# Patient Record
Sex: FEMALE | Race: WHITE
[De-identification: names, ages, dates, MRNs, and addresses within clinical notes are randomized per-mention and may not be internally consistent; named-entity substitution may affect disease eponyms.]

---

## 2017-05-24 ENCOUNTER — HOSPITAL ENCOUNTER (OUTPATIENT)
Dept: HOSPITAL 80 - BHFA | Age: 82
End: 2017-05-24
Attending: INTERNAL MEDICINE
Payer: COMMERCIAL

## 2017-05-24 DIAGNOSIS — I10: ICD-10-CM

## 2017-05-24 DIAGNOSIS — E78.5: ICD-10-CM

## 2017-05-24 DIAGNOSIS — G47.33: ICD-10-CM

## 2017-05-24 DIAGNOSIS — I48.91: Primary | ICD-10-CM

## 2017-05-24 DIAGNOSIS — E11.9: ICD-10-CM

## 2017-05-24 DIAGNOSIS — M19.90: ICD-10-CM

## 2017-05-25 ENCOUNTER — HOSPITAL ENCOUNTER (OUTPATIENT)
Dept: HOSPITAL 80 - FCATH | Age: 82
Discharge: HOME | End: 2017-05-25
Attending: INTERNAL MEDICINE
Payer: COMMERCIAL

## 2017-05-25 DIAGNOSIS — E78.5: ICD-10-CM

## 2017-05-25 DIAGNOSIS — I48.0: Primary | ICD-10-CM

## 2017-05-25 DIAGNOSIS — Z53.8: ICD-10-CM

## 2017-05-25 DIAGNOSIS — E11.9: ICD-10-CM

## 2017-05-25 DIAGNOSIS — G47.33: ICD-10-CM

## 2017-05-25 DIAGNOSIS — Z85.3: ICD-10-CM

## 2017-05-25 DIAGNOSIS — I10: ICD-10-CM

## 2017-05-25 DIAGNOSIS — Z79.01: ICD-10-CM

## 2017-05-25 LAB
ANION GAP SERPL CALC-SCNC: 13 MEQ/L (ref 8–16)
APTT BLD: 39 SEC (ref 23–38)
CALCIUM SERPL-MCNC: 9.9 MG/DL (ref 8.5–10.4)
CHLORIDE SERPL-SCNC: 104 MEQ/L (ref 97–110)
CO2 SERPL-SCNC: 24 MEQ/L (ref 22–31)
CREAT SERPL-MCNC: 1 MG/DL (ref 0.6–1)
GFR SERPL CREATININE-BSD FRML MDRD: 53 ML/MIN/{1.73_M2}
GLUCOSE SERPL-MCNC: 141 MG/DL (ref 70–100)
INR PPP: 1.31 (ref 0.83–1.16)
MAGNESIUM SERPL-MCNC: 2.1 MG/DL (ref 1.6–2.3)
POTASSIUM SERPL-SCNC: 4.4 MEQ/L (ref 3.5–5.2)
PROTHROMBIN TIME: 16.3 SEC (ref 12–15)
SODIUM SERPL-SCNC: 141 MEQ/L (ref 134–144)

## 2017-05-25 PROCEDURE — 4A02X4Z MEASUREMENT OF CARDIAC ELECTRICAL ACTIVITY, EXTERNAL APPROACH: ICD-10-PCS | Performed by: INTERNAL MEDICINE

## 2017-05-25 NOTE — CPEKG
Heart Rate: 125

RR Interval: 480

QRSD Interval: 78

QT Interval: 328

QTC Interval: 473

QRS Axis: 6

T Wave Axis: 68

EKG Severity - ABNORMAL ECG -

EKG Impression: ATRIAL FIBRILLATION

EKG Impression: COMPARED WITH 2/19/2016 AT 4:40 P.M., ATRIAL FIBRILLATION NOW PRESENT

Electronically Signed By: Rima Bocanegra 25-May-2017 11:49:54

## 2017-06-08 ENCOUNTER — HOSPITAL ENCOUNTER (OUTPATIENT)
Dept: HOSPITAL 80 - FIMAGING | Age: 82
End: 2017-06-08
Attending: INTERNAL MEDICINE
Payer: COMMERCIAL

## 2017-06-08 DIAGNOSIS — Z12.31: Primary | ICD-10-CM

## 2017-06-08 DIAGNOSIS — Z90.12: ICD-10-CM

## 2017-06-08 DIAGNOSIS — Z85.3: ICD-10-CM

## 2017-06-26 ENCOUNTER — HOSPITAL ENCOUNTER (INPATIENT)
Dept: HOSPITAL 80 - F2W | Age: 82
LOS: 2 days | Discharge: HOME | DRG: 310 | End: 2017-06-28
Attending: INTERNAL MEDICINE | Admitting: INTERNAL MEDICINE
Payer: COMMERCIAL

## 2017-06-26 DIAGNOSIS — Z79.84: ICD-10-CM

## 2017-06-26 DIAGNOSIS — I10: ICD-10-CM

## 2017-06-26 DIAGNOSIS — I48.91: Primary | ICD-10-CM

## 2017-06-26 DIAGNOSIS — E11.9: ICD-10-CM

## 2017-06-26 LAB
% IMMATURE GRANULYOCYTES: 0.3 % (ref 0–1.1)
ABSOLUTE IMMATURE GRANULOCYTES: 0.02 10^3/UL (ref 0–0.1)
ABSOLUTE NRBC COUNT: 0.02 10^3/UL (ref 0–0.01)
ADD DIFF?: NO
ADD MORPH?: NO
ADD SCAN?: NO
ANION GAP SERPL CALC-SCNC: 9 MEQ/L (ref 8–16)
ATYPICAL LYMPHOCYTE FLAG: 0 (ref 0–99)
CALCIUM SERPL-MCNC: 9.9 MG/DL (ref 8.5–10.4)
CHLORIDE SERPL-SCNC: 104 MEQ/L (ref 97–110)
CO2 SERPL-SCNC: 23 MEQ/L (ref 22–31)
CREAT SERPL-MCNC: 0.9 MG/DL (ref 0.6–1)
ERYTHROCYTE [DISTWIDTH] IN BLOOD BY AUTOMATED COUNT: 12.5 % (ref 11.5–15.2)
FRAGMENT RBC FLAG: 0 (ref 0–99)
GFR SERPL CREATININE-BSD FRML MDRD: 60 ML/MIN/{1.73_M2}
GLUCOSE SERPL-MCNC: 112 MG/DL (ref 70–100)
HCT VFR BLD CALC: 41.2 % (ref 38–47)
HGB BLD-MCNC: 13.9 G/DL (ref 12.6–16.3)
LEFT SHIFT FLG: 0 (ref 0–99)
LIPEMIA HEMOLYSIS FLAG: 80 (ref 0–99)
MAGNESIUM SERPL-MCNC: 2 MG/DL (ref 1.6–2.3)
MCH RBC BLDCO QN: 31.1 PG (ref 27.9–34.1)
MCHC RBC AUTO-ENTMCNC: 33.7 G/DL (ref 32.4–36.7)
MCV RBC AUTO: 92.2 FL (ref 81.5–99.8)
NRBC-AUTO%: 0.3 % (ref 0–0.2)
PLATELET # BLD: 243 10^3/UL (ref 150–400)
PLATELET CLUMPS FLAG: 0 (ref 0–99)
PMV BLD AUTO: 9.6 FL (ref 8.7–11.7)
POTASSIUM SERPL-SCNC: 4.1 MEQ/L (ref 3.5–5.2)
RBC # BLD AUTO: 4.47 10^6/UL (ref 4.18–5.33)
SODIUM SERPL-SCNC: 136 MEQ/L (ref 134–144)

## 2017-06-26 RX ADMIN — SOTALOL HYDROCHLORIDE SCH MG: 80 TABLET ORAL at 20:07

## 2017-06-26 RX ADMIN — RIVAROXABAN SCH MG: 20 TABLET, FILM COATED ORAL at 20:07

## 2017-06-26 RX ADMIN — ATORVASTATIN CALCIUM SCH MG: 20 TABLET, FILM COATED ORAL at 20:07

## 2017-06-26 NOTE — CPEKG
Heart Rate: 60

RR Interval: 1000

P-R Interval: 180

QRSD Interval: 84

QT Interval: 456

QTC Interval: 456

P Axis: 43

QRS Axis: 20

T Wave Axis: 39

EKG Severity - NORMAL ECG -

EKG Impression: SINUS RHYTHM

Electronically Signed By: Eric Fisher 27-Jun-2017 16:14:56

## 2017-06-26 NOTE — PDCARPN
Cardiology Progress Note


Chief Complaint: 





PAF


Assessment/Plan: 


Assessment:





85 y/o F with increasing episodes of AF. Last DCCV 5/25/17.





#. PAF: increasing episodes of AF


   Sotalol titration in this admission


   will follow ECG and elytes


   in SR currently


   [id number] of 5





#. htn: home meds continued





#. DM: on Metformin





#. status inpt for high-risk med titration





#. DVT ppx: on Xarelto as outpatient








Plan:


Start Sotalol with PM dose at 120. ECGs and labs timed appropriately.





06/26/17 13:59





Objective: 





 Vital Signs (8 Hrs)











  Temp Pulse Resp BP Pulse Ox


 


 06/26/17 10:49  98.7 F  64  18  125/72 H  91 L








 Intake/Output (24 Hrs)











 06/25/17 06/26/17 06/27/17





 05:59 05:59 05:59


 


Other:   


 


  Weight   79.7 kg











Result Diagrams: 


 06/26/17 12:15





 06/26/17 12:15





ICD10 Worksheet


Patient Problems: 


 Problems











Problem Status Onset


 


Acute diastolic (congestive) heart failure Acute  


 


Atrial fibrillation Acute

## 2017-06-26 NOTE — CPEKG
Heart Rate: 62

RR Interval: 968

P-R Interval: 180

QRSD Interval: 82

QT Interval: 416

QTC Interval: 423

P Axis: 44

QRS Axis: 22

T Wave Axis: 41

EKG Severity - NORMAL ECG -

EKG Impression: SINUS RHYTHM

Electronically Signed By: Eric Fisher 27-Jun-2017 16:15:22

## 2017-06-27 LAB
ANION GAP SERPL CALC-SCNC: 10 MEQ/L (ref 8–16)
CALCIUM SERPL-MCNC: 9.7 MG/DL (ref 8.5–10.4)
CHLORIDE SERPL-SCNC: 105 MEQ/L (ref 97–110)
CO2 SERPL-SCNC: 23 MEQ/L (ref 22–31)
CREAT SERPL-MCNC: 1 MG/DL (ref 0.6–1)
GFR SERPL CREATININE-BSD FRML MDRD: 53 ML/MIN/{1.73_M2}
GLUCOSE SERPL-MCNC: 99 MG/DL (ref 70–100)
MAGNESIUM SERPL-MCNC: 2 MG/DL (ref 1.6–2.3)
POTASSIUM SERPL-SCNC: 4.2 MEQ/L (ref 3.5–5.2)
SODIUM SERPL-SCNC: 138 MEQ/L (ref 134–144)

## 2017-06-27 RX ADMIN — RIVAROXABAN SCH MG: 20 TABLET, FILM COATED ORAL at 20:14

## 2017-06-27 RX ADMIN — IRBESARTAN SCH MG: 150 TABLET ORAL at 08:44

## 2017-06-27 RX ADMIN — VITAMIN D, TAB 1000IU (100/BT) SCH UNITS: 25 TAB at 08:45

## 2017-06-27 RX ADMIN — ATORVASTATIN CALCIUM SCH MG: 20 TABLET, FILM COATED ORAL at 20:14

## 2017-06-27 RX ADMIN — SOTALOL HYDROCHLORIDE SCH MG: 80 TABLET ORAL at 08:41

## 2017-06-27 RX ADMIN — SOTALOL HYDROCHLORIDE SCH MG: 80 TABLET ORAL at 20:14

## 2017-06-27 RX ADMIN — THERA TABS SCH EACH: TAB at 08:41

## 2017-06-27 NOTE — CPEKG
Heart Rate: 60

RR Interval: 1000

P-R Interval: 180

QRSD Interval: 84

QT Interval: 452

QTC Interval: 452

P Axis: 46

QRS Axis: 27

T Wave Axis: 62

EKG Severity - NORMAL ECG -

EKG Impression: SINUS RHYTHM

Electronically Signed By: Eric Fisher 27-Jun-2017 16:14:32

## 2017-06-27 NOTE — SOAPPROG
SOAP Progress Note


Assessment/Plan: 


1. PAF - Pt has a history of PAF.  She was previously managed with a rate 

control and anticoagulation strategy but was brought in for a Sotalol load 

given increased frequency of A-fib episodes.  Pt has received her first dose of 

Sotalol on 6/26 and her QTC increased to 450.  No significant arrhythmias on 

telemetry monitoring.  Her CHADS2 score is 3.


--> Continue Sotalol load.


--> Continue Xarelto





2. HTN - Well controlled.  Continue current therapy.





3. Hyperlipidemia - LDL = 55 in 12/16.  Continue current therapy.  FLP and LFTs 

in 12/17





4. DM





Subjective: 


No chest pain


No orthopnea or PND


ambulating with out difficulty


1/4 Sotalol given


Telemetry with sinus rhythm and occasional PVCs.


Objective: 





 Vital Signs











Temp Pulse Resp BP Pulse Ox


 


 36.5 C   58 L  14   136/71 H  91 L


 


 06/27/17 07:34  06/27/17 07:34  06/27/17 07:34  06/27/17 07:34  06/27/17 07:34








 Laboratory Results





 06/26/17 12:15 





 06/27/17 03:48 





 











 06/26/17 06/27/17 06/28/17





 05:59 05:59 05:59


 


Intake Total  850 


 


Output Total  400 650


 


Balance  450 -650














Physical Exam





- Physical Exam


General Appearance: alert, no apparent distress


Respiratory: lungs clear


Cardiac/Chest: regular rate, rhythm


Abdomen: non-tender, soft


Extremities: No pedal edema





ICD10 Worksheet


Patient Problems: 


 Problems











Problem Status Onset


 


Acute diastolic (congestive) heart failure Acute  


 


Atrial fibrillation Acute

## 2017-06-27 NOTE — CPEKG
Heart Rate: 57

RR Interval: 1053

P-R Interval: 184

QRSD Interval: 86

QT Interval: 484

QTC Interval: 472

P Axis: 48

QRS Axis: 15

T Wave Axis: 51

EKG Severity - NORMAL ECG -

EKG Impression: SINUS RHYTHM

Electronically Signed By: Jaycob Rice 29-Jun-2017 09:46:37

## 2017-06-28 VITALS
HEART RATE: 57 BPM | OXYGEN SATURATION: 89 % | RESPIRATION RATE: 17 BRPM | SYSTOLIC BLOOD PRESSURE: 107 MMHG | TEMPERATURE: 98 F | DIASTOLIC BLOOD PRESSURE: 59 MMHG

## 2017-06-28 RX ADMIN — VITAMIN D, TAB 1000IU (100/BT) SCH UNITS: 25 TAB at 08:47

## 2017-06-28 RX ADMIN — IRBESARTAN SCH MG: 150 TABLET ORAL at 08:46

## 2017-06-28 RX ADMIN — THERA TABS SCH EACH: TAB at 08:47

## 2017-06-28 NOTE — SOAPPROG
SOAP Progress Note


Assessment/Plan: 


1. PAF - Pt has a history of PAF.  She was previously managed with a rate 

control and anticoagulation strategy but was scheduled for a Sotalol load given 

increased frequency of A-fib episodes.  Pt has received her first dose of 

Sotalol on 6/26 and her QTC increased to 450.  She received her third dose on 6/ 27 and her QTC increased to 475.  Will decrease Sotalol to 80 mg bid.  No 

significant arrhythmias on telemetry monitoring.  Her CHADS2 score is 3.


--> Continue Sotalol load.


--> Will decrease xarelto to 15 mg daily given Cr clearance of 42.





2. HTN - Well controlled.  Continue current therapy.





3. Hyperlipidemia - LDL = 55 in 12/16.  Continue current therapy.  FLP and LFTs 

in 12/17





4. DM








Subjective: 


No chest pain


No orthopnea or PND


Ambulating with out difficulty


No ADR to Sotalol


Telemetry with sinus rhythm and artifact





Objective: 





 Vital Signs











Temp Pulse Resp BP Pulse Ox


 


 36.2 C   55 L  20   92/63 L  90 L


 


 06/28/17 07:41  06/28/17 07:41  06/28/17 07:41  06/28/17 07:41  06/28/17 07:41








 Laboratory Results





 06/26/17 12:15 





 06/27/17 03:48 





 











 06/27/17 06/28/17 06/29/17





 05:59 05:59 05:59


 


Intake Total 850 1200 


 


Output Total 400 1700 600


 


Balance 450 -500 -600














Physical Exam





- Physical Exam


General Appearance: alert, no apparent distress


Respiratory: lungs clear


Cardiac/Chest: regular rate, rhythm


Abdomen: non-tender, soft


Extremities: No pedal edema


Neuro/Psych: alert, oriented x 3





ICD10 Worksheet


Patient Problems: 


 Problems











Problem Status Onset


 


Acute diastolic (congestive) heart failure Acute  


 


Atrial fibrillation Acute

## 2017-06-28 NOTE — CPEKG
Heart Rate: 56

RR Interval: 1071

P-R Interval: 184

QRSD Interval: 82

QT Interval: 440

QTC Interval: 425

P Axis: 47

QRS Axis: 37

T Wave Axis: 64

EKG Severity - NORMAL ECG -

EKG Impression: SINUS RHYTHM

Electronically Signed By: Jaycob Rice 29-Jun-2017 09:46:40

## 2017-08-23 ENCOUNTER — HOSPITAL ENCOUNTER (OUTPATIENT)
Dept: HOSPITAL 80 - BHFA | Age: 82
End: 2017-08-23
Attending: INTERNAL MEDICINE
Payer: COMMERCIAL

## 2017-08-23 DIAGNOSIS — I48.91: Primary | ICD-10-CM

## 2017-08-23 DIAGNOSIS — R06.00: ICD-10-CM

## 2017-08-23 PROCEDURE — A9500 TC99M SESTAMIBI: HCPCS

## 2017-08-23 PROCEDURE — 93017 CV STRESS TEST TRACING ONLY: CPT

## 2017-08-23 PROCEDURE — 78452 HT MUSCLE IMAGE SPECT MULT: CPT

## 2018-06-25 ENCOUNTER — HOSPITAL ENCOUNTER (OUTPATIENT)
Dept: HOSPITAL 80 - FIMAGING | Age: 83
End: 2018-06-25
Attending: INTERNAL MEDICINE
Payer: COMMERCIAL

## 2018-06-25 DIAGNOSIS — Z17.0: ICD-10-CM

## 2018-06-25 DIAGNOSIS — Z90.12: ICD-10-CM

## 2018-06-25 DIAGNOSIS — Z12.31: Primary | ICD-10-CM

## 2018-06-25 DIAGNOSIS — Z85.3: ICD-10-CM

## 2019-03-08 ENCOUNTER — HOSPITAL ENCOUNTER (OUTPATIENT)
Dept: HOSPITAL 80 - FIMAGING | Age: 84
End: 2019-03-08
Attending: NURSE PRACTITIONER
Payer: COMMERCIAL

## 2019-03-08 DIAGNOSIS — J98.11: ICD-10-CM

## 2019-03-08 DIAGNOSIS — J40: Primary | ICD-10-CM
